# Patient Record
Sex: MALE | Race: WHITE
[De-identification: names, ages, dates, MRNs, and addresses within clinical notes are randomized per-mention and may not be internally consistent; named-entity substitution may affect disease eponyms.]

---

## 2017-01-06 ENCOUNTER — HOSPITAL ENCOUNTER (EMERGENCY)
Dept: HOSPITAL 9 - MADERS | Age: 52
Discharge: HOME | End: 2017-01-06
Payer: COMMERCIAL

## 2017-01-06 DIAGNOSIS — Z79.899: ICD-10-CM

## 2017-01-06 DIAGNOSIS — I10: ICD-10-CM

## 2017-01-06 DIAGNOSIS — J40: ICD-10-CM

## 2017-01-06 DIAGNOSIS — E78.5: ICD-10-CM

## 2017-01-06 DIAGNOSIS — J02.9: Primary | ICD-10-CM

## 2017-01-06 DIAGNOSIS — Z79.82: ICD-10-CM

## 2017-01-06 DIAGNOSIS — E78.00: ICD-10-CM

## 2017-01-06 DIAGNOSIS — F17.210: ICD-10-CM

## 2017-01-06 DIAGNOSIS — E11.9: ICD-10-CM

## 2017-01-06 PROCEDURE — 99283 EMERGENCY DEPT VISIT LOW MDM: CPT

## 2017-01-06 NOTE — ERRECORD
[facility]

                                EMERGENCY RECORD



HPI COUGH (17:40 LHOD)

CHIEF COMPLAINT: Patient presents for evaluation of

      cough. HISTORIAN: History provided by patient.

      TIME COURSE:  3-4 DAYS OF SORE THROAT AND NASAL

      CONGESTION. NOW COUGHING UP GREEN PHLEGM. NO FEVER.



ROS (22:23 LHOD)

CONSTITUTIONAL: Historian denies fever.

ENT: Historian reports rhinorrhea, reports sore

      throat.

CARDIOVASCULAR: Historian denies chest pain, denies edema.

RESPIRATORY: Historian reports cough, denies shortness

      of breath, reports sputum.

GI: Historian denies abdominal pain, denies nausea, denies

      vomiting.

MUSCULOSKELETAL: Historian denies back pain, denies neck pain.

SKIN: Historian denies rash.

NEUROLOGIC: Historian denies headache.

HEMO/LYMPHATIC: Historian denies easy bruising.

NOTES: All systems reviewed, negative except as described above.



PAST MEDICAL HISTORY

MEDICAL HISTORY:  Past medical history includes history of

      diabetes, Type II, Past medical history includes history of

      hyperlipidemia, high cholesterol, Past medical history includes

      history of hypertension, which has been treated, Patient is

      compliant. (17:28 CJEF)

MALE SURGICAL HISTORY:  TUMOR REMOVED R SIDE NECK, Surgical

      history of orthopedic surgery, R & L SHOULDER. L KNEE SCOPED FOR TORN

      MENISCUS. (17:28 CJEF)

PSYCHIATRIC HISTORY:  Notes: DENIES. (17:28 CJEF)

SOCIAL HISTORY:  Patient denies alcohol use, Patient denies

      drug use, Patient currently uses tobacco, smokes cigarettes, daily,

      IN PROCESS OF QUITTING NOW - USING ELECTRONIC, Patient denies alcohol

      use, Patient denies drug use, Patient currently uses tobacco, smokes

      cigarettes, daily, Patient has smoked for 30 years, Patient smokes 1

      pack per day. (17:28 CJEF)

NOTES: Nursing records reviewed. (17:31 LHOD)



KNOWN ALLERGIES

morphine: Severity: Severe, - HEADACHE



CURRENT MEDICATIONS

pioglitazone: 

      TABLET : Strength - 15 mg : ORAL

      Patient Dose: 15 mg Oral once a day. (17:27 CJEF)

meloxicam: 

      TABLET : Strength - 7.5 mg : ORAL

      Patient Dose: 7.5 mg Oral once a day. (17:27 CJEF)

aspirin: 



&a-1R&a+25V*p+0X*u4265U*c202B*c15G*c2P*p-0X&a-25V&a+1R         Name: Jordan Cox  : 1965 M51 MedRec: [id number]

                             AcctNum: [id number]

  Prepared:  22:29 by Interface                 Page 1 of 3

                                      pMD

                        [facility]

                                EMERGENCY RECORD



      TABLET : Strength - 81 mg : ORAL

      Patient Dose: 81 mg Oral. (17:28 CJEF)

atorvastatin: 

      TABLET : Strength - 10 mg : ORAL

      Patient Dose: 20 mg Oral. (17:28 CJEF)

lisinopril: 

      TABLET : Strength - 2.5 mg : ORAL

      Patient Dose: 10 mg Oral once a day. (17:28 CJEF)



VITAL SIGNS

VITAL SIGNS: Pulse: 78, Resp: 18, Temp: 98.6 (Tympanic), Pain: 4,

      O2 sat: 97 on Room Air, Time: 2017 17:23. (17:23 CJEF)

  BP: 152/71, Time: 2017 17:28. (17:28 CJEF)



PHYSICAL EXAM (22:24 LHOD)

CONSTITUTIONAL: Vital Signs Reviewed, Patient afebrile, Pulse

      normal, Blood pressure, hypertensive, Respiratory

      rate normal, Normal pulse oximetry, Patient appears non toxic,

      Patient alert and oriented to person, place and time.

EYES: Pupils equally round and reactive to light, Extraocular

      muscles intact.

ENT: Ear exam normal, Pharynx, injected

      bilaterally.

NECK: Neck exam included findings of normal range of motion,

      Trachea midline.

RESPIRATORY CHEST: Respiratory exam included findings of no

      respiratory distress, Breath sounds clear.

CARDIOVASCULAR: Cardiovascular exam included findings of heart

      rate regular rate and rhythm, Heart sounds normal.

ABDOMEN MALE: Abdominal exam included findings of abdomen

      nontender.

BACK: Back exam normal.

UPPER EXTREMITY: Upper extremity exam normal.

LOWER EXTREMITY: Left lower leg exam normal, Right lower leg exam

      normal.

NEURO: Neuro exam findings include patient oriented to person,

      place and time, Speech normal.



PROBLEM LIST

  No recorded problems



DIAGNOSIS (17:35 LHOD)

FINAL: PRIMARY: BRONCHITIS / PHARYNGITIS.



PRESCRIPTION (17:36 LHOD)

Zithromax oral:  TABLET : 250 mg : ORAL : Quantity: *** 1 ***

      Unit: tab(s) Route: ORAL Schedule: once a day (in the morning)

      Dispense: *** 12 ***

      May substitute. Refills: *** No Refills ***.

NOTES:  No Refills.



&a-1R&a+25V*p+0X*p1529T*c202B*c15G*c2P*p-0X&a-25V&a+1R         Name: Jordan Cox  : 1965 M51 MedRec: [id number]

                             AcctNum: [id number]

  Prepared:  22:29 by Interface                 Page 2 of 3

                                      pMD

                        [facility]

                                EMERGENCY RECORD





DISPOSITION

PATIENT:  Disposition Type: Discharge, Disposition: *Discharge

      Home, Condition: Good. (17:35 LHOD)

   Patient left the department. (17:46 CJEF)

Key:

  CJNELLA=VANDANA Cruz, Kathleen  LHOD=MD Tres, Carole

























































































&a-1R&a+25V*p+0X*h9901Y*c202B*c15G*c2P*p-0X&a-25V&a+1R         Name: Jordan Cox  : 1965 M51 MedRec: [id number]

                             AcctNum: [id number]

  Prepared:  22:29 by Interface                 Page 3 of 3

                                      pMD

MTDD

## 2017-01-06 NOTE — PICIS
[facility]

                                EMERGENCY RECORD



TRIAGE (17:25 CJEF)

TRIAGE NOTES:  PT REPORTS SORE THROAT AND UPPER RESPIRATORY

      SINUS ISSUES. PT REPORTS COUGHING THICK GREEN MUCUS. PT REPORTS

      SYMPTOMS STARTED X4 DAYS AGO. (17:25 CJEF)

PATIENT: NAME: Jordan Cox, AGE: 51, GENDER: male, :

      Thu Aug 08, 1965, TIME OF GREET:  17:20, PREFERRED

      LANGUAGE: English, ETHNICITY: Not  or , ECODE BILLING

      MAP: [facility], SSN: [id number], Zip Code: [address], [place]

      WEIGHT: 135.17, PHONE: [phone], MEDICAL RECORD NUMBER:

      [id number], ACCOUNT NUMBER: [id number], PERSON ID: [id number], PCP:

      EEMLYN FENG. (17:25 CJEF)

COMPLAINT:  SORE THROAT/ SINUS PROBLEMS. (17:25 CJEF)

ADMISSION: URGENCY: 4 Non Urgent, ADMISSION SOURCE: Home,

      TRANSPORT: Walk-in, BED: TRIAGE. (17:25 CJEF)

ASSESSMENT: Assessment: SORE THROAT, COUGH. (17:28 CJEF)

PAIN: Patient complains of pain described as, Location

      THROAT. (17:28 CJEF)

IMMUNIZATIONS: Flu vaccine not up to date, Tetanus

      immunization up to date, Pneumococcal vaccine not up to

      date. (17:28 CJEF)

SIRS SCORING: Heart Rate  (0), Temp range 96.8-101.1 (0),

      respiratory rate 12-24 (0), Mental Status altered: no (0), Infection

      or Suspected Infection: No. (17:28 CJEF)

TRIAGE SCREENING: Patient denies suicidal ideation, Patient

      denies presence of domestic violence. (17:28 CJEF)

PROVIDERS: TRIAGE NURSE: Kathleen Cruz RN. (17:25 CJEF)

VITAL SIGNS: Pulse 78, Resp 18, Temp 98.6, (Tympanic), Pain 4, O2

      Sat 97, on Room Air, Time 2017 17:23. (17:23 CJEF)

  /71, Time 2017 17:28. (17:28 CJEF)

PREVIOUS VISIT ALLERGIES: morphine. (17:25 CJEF)

  morphine. (17:28 CJEF)



KNOWN ALLERGIES

morphine: Severity: Severe, - HEADACHE



CURRENT MEDICATIONS

pioglitazone: 

      TABLET : Strength - 15 mg : ORAL

      Patient Dose: 15 mg Oral once a day. (17:27 CJEF)

meloxicam: 

      TABLET : Strength - 7.5 mg : ORAL

      Patient Dose: 7.5 mg Oral once a day. (17:27 CJEF)

aspirin: 

      TABLET : Strength - 81 mg : ORAL

      Patient Dose: 81 mg Oral. (17:28 CJEF)

atorvastatin: 

      TABLET : Strength - 10 mg : ORAL

      Patient Dose: 20 mg Oral. (17:28 CJEF)

lisinopril: 

      TABLET : Strength - 2.5 mg : ORAL



&a-1R&a+25V*p+0X*f0672L*c202B*c15G*c2P*p-0X&a-25V&a+1R         Name: Jordan Cox  : 1965 M51 MedRec: [id number]

                             AcctNum: [id number]

  Prepared:  17:59 by Interface                 Page 1 of 4

                                      pMD

                        [facility]

                                EMERGENCY RECORD



      Patient Dose: 10 mg Oral once a day. (17:28 CJEF)



VITAL SIGNS

VITAL SIGNS: Pulse: 78, Resp: 18, Temp: 98.6 (Tympanic), Pain: 4,

      O2 sat: 97 on Room Air, Time: 2017 17:23. (17:23 CJEF)

  BP: 152/71, Time: 2017 17:28. (17:28 CJEF)



NURSING ASSESSMENT: ENT (17:29 CJEF)

CONSTITUTIONAL: Complex assessment performed, Patient arrives

      ambulatory, Gait steady, History obtained from patient, Patient

      appears comfortable, Patient cooperative, Patient alert, Oriented to

      person, place and time, Skin warm, Skin dry, Skin normal in color,

      Mucous membranes pink, Mucous membranes moist, Patient is

      well-groomed, PT REPORTS SORE THROAT AND UPPER RESPIRATORY SINUS

      ISSUES. PT REPORTS COUGHING THICK GREEN MUCUS. PT REPORTS SYMPTOMS

      STARTED X4 DAYS AGO.

PAIN: aching pain, to the throat, on a

      scale 0-10 patient rates pain as 4.

ENT: Ear assessment findings include ear normal to inspection,

      Nasal assessment findings include nose normal to inspection,

      Discharge, green, from bilateral nare,

      Congestion, bilaterally, Mouth and throat

      assessment findings include mouth inspection normal, Associated

      with fever, Maximum temperature (degree F) 100.3,

      orally, SEVERAL DAYS AGO.

RESPIRATORY/CHEST: Breath sounds clear, Respiratory assessment

      findings include respiratory effort easy, Respirations regular,

      Conversing normally, Neck and chest exam findings include trachea

      midline, Chest expansion equal, Chest movement symmetrical, no signs

      of distress, Associated with cough, productive

      of, green sputum.

NOTES: Patient tolerated procedure well.

SAFETY: Side rails up, Cart/Stretcher in lowest position, Family

      at bedside, Call light within reach, Hospital ID band on.



NURSING PROCEDURE: DISCHARGE NOTE (17:45 [facility])

DISCHARGE: Patient discharged to home, ambulating without

      assistance, driving self, accompanied by /wife/partner,

      Summary of Care printed/ provided, Patient requested and was provided

      an electronic copy of Discharge Instructions, Transition record given

      to patient, Discharge instructions given to patient, Simple or

      moderate discharge teaching performed, Prescriptions given and

      instructions on side effects given, Medication reconciliation form

      given, Above person(s) verbalized understanding of discharge

      instructions and follow-up care, Patient treated and evaluated by

      physician.

BELONGINGS: Belongings remain with patient.

NOTES: Patient tolerated procedure well.

SAFETY: Side rails up, Cart/Stretcher in lowest position, Family

      at bedside, Call light within reach, Hospital ID band on.



&a-1R&a+25V*p+0X*c9491I*c202B*c15G*c2P*p-0X&a-25V&a+1R         Name: Jordan Cox  : 1965 M51 MedRec: [id number]

                             AcctNum: [id number]

  Prepared:  17:59 by Interface                 Page 2 of 4

                                      pMD

                        [facility]

                                EMERGENCY RECORD





HPI COUGH (17:40 LHOD)

CHIEF COMPLAINT: Patient presents for evaluation of

      cough. HISTORIAN: History provided by patient.

      TIME COURSE:  3-4 DAYS OF SORE THROAT AND NASAL

      CONGESTION. NOW COUGHING UP GREEN PHLEGM. NO FEVER.



PAST MEDICAL HISTORY

MEDICAL HISTORY:  Past medical history includes history of

      diabetes, Type II, Past medical history includes history of

      hyperlipidemia, high cholesterol, Past medical history includes

      history of hypertension, which has been treated, Patient is

      compliant. (17:28 [facility])

MALE SURGICAL HISTORY:  TUMOR REMOVED R SIDE NECK, Surgical

      history of orthopedic surgery, R & L SHOULDER. L KNEE SCOPED FOR TORN

      MENISCUS. (17:28 CJEF)

PSYCHIATRIC HISTORY:  Notes: DENIES. (17:28 CJEF)

SOCIAL HISTORY:  Patient denies alcohol use, Patient denies

      drug use, Patient currently uses tobacco, smokes cigarettes, daily,

      IN PROCESS OF QUITTING NOW - USING ELECTRONIC, Patient denies alcohol

      use, Patient denies drug use, Patient currently uses tobacco, smokes

      cigarettes, daily, Patient has smoked for 30 years, Patient smokes 1

      pack per day. (17:28 CJEF)

NOTES: Nursing records reviewed. (17:31 LHOD)



EVENTS

TRANSFER:  Triage to Emergency Triage. (2017 17:25

      CJEF)

   Emergency Triage to Main ED -01. (17:28 CJEF)

   Removed from Emergency Main ED -01. (17:46 CJEF)



PROBLEM LIST

  No recorded problems



DIAGNOSIS (17:35 LHOD)

FINAL: PRIMARY: BRONCHITIS / PHARYNGITIS.



DISPOSITION

PATIENT:  Disposition Type: Discharge, Disposition: *Discharge

      Home, Condition: Good. (17:35 LHOD)

   Patient left the department. (17:46 CJEF)



INSTRUCTION (17:37 LHOD)

DISCHARGE:  BRONCHITIS, ABX TX (ADULT).

FOLLOWUP: Follow up with Primary Care Physician as needed.

SPECIAL:  CHLORASEPTIC THROAT SPRAY.

      OVER THE COUNTER DECONGESTANT / ANTI-HISTAMINE

      *RETURN IF WORSE

      Follow-up with your PCP.





&a-1R&a+25V*p+0X*h1777A*c202B*c15G*c2P*p-0X&a-25V&a+1R         Name: Jordan Cox  : 1965 M51 MedRec: [id number]

                             AcctNum: [id number]

  Prepared:  17:59 by Interface                 Page 3 of 4

                                      pMD

                        [facility]

                                EMERGENCY RECORD



PRESCRIPTION (17:36 LHOD)

Zithromax oral:  TABLET : 250 mg : ORAL : Quantity: *** 1 ***

      Unit: tab(s) Route: ORAL Schedule: once a day (in the morning)

      Dispense: *** 12 ***

      May substitute. Refills: *** No Refills ***.

NOTES:  No Refills.



IMAGING (17:47 CJEF)

*DISCHARGE INSTRUCTIONS RECEIPT:  Image captured from scanner.

*SUPPLY CHARGE SHEET:  Image captured from scanner.

Key:

  CJEF=VANDANA Crzu, Kathleen  LHOD=MD Tres, Carole















































































&a-1R&a+25V*p+0X*i0807R*c202B*c15G*c2P*p-0X&a-25V&a+1R         Name: Jordan Cox  : 1965 M51 MedRec: [id number]

                             AcctNum: [id number]

  Prepared:  17:59 by Interface                 Page 4 of 4

                                      pMD

MTDD

## 2018-07-01 ENCOUNTER — HOSPITAL ENCOUNTER (EMERGENCY)
Dept: HOSPITAL 9 - MADERS | Age: 53
Discharge: HOME | End: 2018-07-01
Payer: COMMERCIAL

## 2018-07-01 DIAGNOSIS — S20.212A: Primary | ICD-10-CM

## 2018-07-01 DIAGNOSIS — E11.9: ICD-10-CM

## 2018-07-01 DIAGNOSIS — I10: ICD-10-CM
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
## 2018-07-01 DIAGNOSIS — E78.5: ICD-10-CM

## 2018-07-01 DIAGNOSIS — F17.210: ICD-10-CM

## 2018-07-01 PROCEDURE — 99283 EMERGENCY DEPT VISIT LOW MDM: CPT
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
## 2021-06-04 ENCOUNTER — HOSPITAL ENCOUNTER (OUTPATIENT)
Dept: HOSPITAL 92 - RAD | Age: 56
Discharge: HOME | End: 2021-06-04
Attending: ORTHOPAEDIC SURGERY
Payer: COMMERCIAL

## 2021-06-04 DIAGNOSIS — S96.912A: ICD-10-CM

## 2021-06-04 DIAGNOSIS — S46.012A: Primary | ICD-10-CM

## 2021-06-04 PROCEDURE — A9579 GAD-BASE MR CONTRAST NOS,1ML: HCPCS

## 2021-06-04 PROCEDURE — 23350 INJECTION FOR SHOULDER X-RAY: CPT

## 2021-06-30 ENCOUNTER — HOSPITAL ENCOUNTER (OUTPATIENT)
Dept: HOSPITAL 92 - TBSIIMAG | Age: 56
Discharge: HOME | End: 2021-06-30
Attending: FAMILY MEDICINE
Payer: COMMERCIAL

## 2021-06-30 DIAGNOSIS — M47.22: Primary | ICD-10-CM

## 2021-06-30 PROCEDURE — 72141 MRI NECK SPINE W/O DYE: CPT

## 2022-08-01 ENCOUNTER — APPOINTMENT (RX ONLY)
Dept: URBAN - METROPOLITAN AREA CLINIC 116 | Facility: CLINIC | Age: 57
Setting detail: DERMATOLOGY
End: 2022-08-01

## 2022-08-01 DIAGNOSIS — Z71.89 OTHER SPECIFIED COUNSELING: ICD-10-CM

## 2022-08-01 DIAGNOSIS — L57.0 ACTINIC KERATOSIS: ICD-10-CM

## 2022-08-01 PROCEDURE — 17000 DESTRUCT PREMALG LESION: CPT

## 2022-08-01 PROCEDURE — 99212 OFFICE O/P EST SF 10 MIN: CPT | Mod: 25

## 2022-08-01 PROCEDURE — 17003 DESTRUCT PREMALG LES 2-14: CPT

## 2022-08-01 PROCEDURE — ? LIQUID NITROGEN

## 2022-08-01 PROCEDURE — ? COUNSELING

## 2022-08-01 ASSESSMENT — LOCATION DETAILED DESCRIPTION DERM
LOCATION DETAILED: RIGHT ANTERIOR PROXIMAL UPPER ARM
LOCATION DETAILED: RIGHT VENTRAL PROXIMAL FOREARM
LOCATION DETAILED: LEFT PROXIMAL DORSAL FOREARM
LOCATION DETAILED: LEFT DISTAL RADIAL DORSAL FOREARM
LOCATION DETAILED: RIGHT DISTAL DORSAL FOREARM
LOCATION DETAILED: XIPHOID
LOCATION DETAILED: LEFT PROXIMAL POSTERIOR UPPER ARM

## 2022-08-01 ASSESSMENT — LOCATION ZONE DERM
LOCATION ZONE: TRUNK
LOCATION ZONE: ARM

## 2022-08-01 ASSESSMENT — LOCATION SIMPLE DESCRIPTION DERM
LOCATION SIMPLE: RIGHT FOREARM
LOCATION SIMPLE: RIGHT UPPER ARM
LOCATION SIMPLE: ABDOMEN
LOCATION SIMPLE: LEFT FOREARM
LOCATION SIMPLE: LEFT UPPER ARM

## 2022-08-01 NOTE — PROCEDURE: LIQUID NITROGEN
